# Patient Record
Sex: MALE | Race: WHITE | Employment: FULL TIME | URBAN - NONMETROPOLITAN AREA
[De-identification: names, ages, dates, MRNs, and addresses within clinical notes are randomized per-mention and may not be internally consistent; named-entity substitution may affect disease eponyms.]

---

## 2020-06-03 ENCOUNTER — HOSPITAL ENCOUNTER (OUTPATIENT)
Dept: GENERAL RADIOLOGY | Age: 50
Discharge: HOME | End: 2020-06-03
Payer: COMMERCIAL

## 2020-06-03 ENCOUNTER — HOSPITAL ENCOUNTER (OUTPATIENT)
Dept: PAIN MANAGEMENT | Age: 50
Discharge: HOME | End: 2020-06-03
Payer: COMMERCIAL

## 2020-06-03 VITALS
DIASTOLIC BLOOD PRESSURE: 83 MMHG | SYSTOLIC BLOOD PRESSURE: 145 MMHG | HEIGHT: 74 IN | TEMPERATURE: 96.8 F | HEART RATE: 86 BPM | WEIGHT: 206.13 LBS | OXYGEN SATURATION: 99 % | RESPIRATION RATE: 18 BRPM | BODY MASS INDEX: 26.45 KG/M2

## 2020-06-03 PROBLEM — M79.18 MYOFASCIAL MUSCLE PAIN: Status: ACTIVE | Noted: 2020-06-03

## 2020-06-03 PROBLEM — G89.29 CHRONIC BILATERAL LOW BACK PAIN WITHOUT SCIATICA: Status: ACTIVE | Noted: 2020-06-03

## 2020-06-03 PROBLEM — M62.830 MUSCLE SPASM OF BACK: Status: ACTIVE | Noted: 2020-06-03

## 2020-06-03 PROBLEM — M25.512 CHRONIC LEFT SHOULDER PAIN: Status: ACTIVE | Noted: 2020-06-03

## 2020-06-03 PROBLEM — M54.50 CHRONIC BILATERAL LOW BACK PAIN WITHOUT SCIATICA: Status: ACTIVE | Noted: 2020-06-03

## 2020-06-03 PROBLEM — M17.0 PRIMARY OSTEOARTHRITIS OF BOTH KNEES: Status: ACTIVE | Noted: 2020-06-03

## 2020-06-03 PROBLEM — G89.29 CHRONIC LEFT SHOULDER PAIN: Status: ACTIVE | Noted: 2020-06-03

## 2020-06-03 PROCEDURE — 99205 OFFICE O/P NEW HI 60 MIN: CPT

## 2020-06-03 PROCEDURE — 72120 X-RAY BEND ONLY L-S SPINE: CPT

## 2020-06-03 PROCEDURE — 73560 X-RAY EXAM OF KNEE 1 OR 2: CPT

## 2020-06-03 PROCEDURE — 73030 X-RAY EXAM OF SHOULDER: CPT

## 2020-06-03 PROCEDURE — 99204 OFFICE O/P NEW MOD 45 MIN: CPT | Performed by: NURSE PRACTITIONER

## 2020-06-03 RX ORDER — TESTOSTERONE CYPIONATE 200 MG/ML
1 VIAL (ML) INTRAMUSCULAR
COMMUNITY
End: 2021-04-06

## 2020-06-03 RX ORDER — OMEPRAZOLE 40 MG/1
1 CAPSULE, DELAYED RELEASE ORAL DAILY
COMMUNITY

## 2020-06-03 RX ORDER — HYDROCHLOROTHIAZIDE 25 MG/1
1 TABLET ORAL DAILY
COMMUNITY

## 2020-06-03 RX ORDER — BUPRENORPHINE AND NALOXONE 8; 2 MG/1; MG/1
2.5 FILM, SOLUBLE BUCCAL; SUBLINGUAL DAILY
COMMUNITY
End: 2021-04-06

## 2020-06-03 ASSESSMENT — PAIN DESCRIPTION - DIRECTION: RADIATING_TOWARDS: CHRONIC PAIN

## 2020-06-03 ASSESSMENT — PAIN DESCRIPTION - PAIN TYPE: TYPE: CHRONIC PAIN

## 2020-06-03 ASSESSMENT — PAIN SCALES - GENERAL: PAINLEVEL_OUTOF10: 6

## 2020-06-03 NOTE — H&P
[facility] Physical and Pain Medicine    History and Physical    Patient Name: Zackary Huerta    MR #: [id number]    Account #: [de-identified]    : 1970    Age: 52 y.o. Sex: male    Date: Loida 3, 2020    PCP: RENA Kessler CNP         Referring Provider:    Chief Complaint:   Chief Complaint   Patient presents with    Chronic Pain       History of Present Illness:    Zackary Huerta is a 52 y.o. male who presents to the office with primary complaints of low back, left shoulder, bilateral knees and on occasion his right shoulder. He has had a job that was hard on his joining most of his left. Approximately 6 months ago he had an accident where he tore his right deltoid ligament and had to have surgery. The first surgery did not correct the problem so he had a second surgery. He was started on Norco 4 times a day before the first surgery for his pain and continued on after his second surgery. He went to see his PCP and was told that if you have been on narcotics for a month or longer that you need to be started on Suboxone, which he was and has been on it going on 4 months. He went to see his PCP not to long ago and was told that he might need a different type of therapy such as going to pain management. He says that he is afraid to get injections, more so in his back for his back pain. He explains that he soul have read more on the Suboxone before he let his PCP start him on the medication. He wants to get off of the medication and even mentions that he is just going to sop taking it. He says that he cannot sleep on his left shoulder due to the pain that he has that is deep inside the joint. He also says that his knees pop and crack all of the time. Everything makes his joints hurt. He is a  at his job and he works long hours. He says that he does no care if he is every started on pain medication. He just wants off of the Suboxone and get his life back.  Says that the only thing he does is go to work and come home. He does not feel like messing with the kids and it really does upset him. He is weill to do injections to his left shoulder and both knees, but not to his back. Employment: Retired []   Disabled  []   Works [x]     Does Not Work []     Previous Injury:  Yes  []   No [x]     Previous Surgery: Yes [x]   No [] right shoulder    Previous Physical Therapy In the last 6 months? Yes  []    No [x]   Did Physical Therapy make thepain better or worse? Better []   Worse []  Unchanged []     MRI in the last two years? Yes []  No [x]  Results reviewed with patient? Yes []   No []    CT Scan in the last two years? Yes  []   No [x]  Results reviewed with patient? Yes []   No []     X-ray in the last two years? Yes []   No  [x]  Results reviewed with patient? Yes  []  No []     Injections in the past?  Yes []   No [x]   Did the injections help relieve the pain? Yes []   No []     Do you have Depression? Yes  []    No [x]  Thinking of harming yourself or others? Yes  []   No [x]    Past Medical Histoy  Past Medical History:   Diagnosis Date    Hypertension        Surgery History  History reviewed. No pertinent surgical history. Allergies  Patient has no known allergies. Current Medications  Current Outpatient Medications   Medication Sig Dispense Refill    Testosterone Cypionate 200 MG/ML SOLN Inject 1 mL into the muscle every 30 days      buprenorphine-naloxone (SUBOXONE) 8-2 MG FILM SL film Place 2.5 Film under the tongue daily.  diclofenac (VOLTAREN) 50 MG EC tablet Take 1 tablet by mouth 2 times daily      hydroCHLOROthiazide (HYDRODIURIL) 25 MG tablet Take 1 tablet by mouth daily      omeprazole (PRILOSEC) 40 MG delayed release capsule Take 1 capsule by mouth daily       No current facility-administered medications for this encounter.         Social History    Social History     Tobacco Use    Smoking status: Current Every Day Smoker   Substance

## 2020-06-11 ENCOUNTER — HOSPITAL ENCOUNTER (OUTPATIENT)
Dept: PAIN MANAGEMENT | Age: 50
Discharge: HOME | End: 2020-06-11
Payer: COMMERCIAL

## 2020-06-11 VITALS
SYSTOLIC BLOOD PRESSURE: 124 MMHG | TEMPERATURE: 97.6 F | HEART RATE: 85 BPM | DIASTOLIC BLOOD PRESSURE: 72 MMHG | OXYGEN SATURATION: 98 % | RESPIRATION RATE: 18 BRPM

## 2020-06-11 PROCEDURE — 2500000003 HC RX 250 WO HCPCS

## 2020-06-11 PROCEDURE — 6360000002 HC RX W HCPCS

## 2020-06-11 PROCEDURE — 20610 DRAIN/INJ JOINT/BURSA W/O US: CPT | Performed by: NURSE PRACTITIONER

## 2020-06-11 PROCEDURE — 20610 DRAIN/INJ JOINT/BURSA W/O US: CPT

## 2020-06-11 RX ORDER — BUPIVACAINE HYDROCHLORIDE 5 MG/ML
1 INJECTION, SOLUTION EPIDURAL; INTRACAUDAL ONCE
Status: DISCONTINUED | OUTPATIENT
Start: 2020-06-11 | End: 2020-06-13 | Stop reason: HOSPADM

## 2020-06-11 RX ORDER — TRIAMCINOLONE ACETONIDE 40 MG/ML
40 INJECTION, SUSPENSION INTRA-ARTICULAR; INTRAMUSCULAR ONCE
Status: DISCONTINUED | OUTPATIENT
Start: 2020-06-11 | End: 2020-06-13 | Stop reason: HOSPADM

## 2020-06-11 RX ORDER — LIDOCAINE HYDROCHLORIDE 10 MG/ML
1 INJECTION, SOLUTION EPIDURAL; INFILTRATION; INTRACAUDAL; PERINEURAL ONCE
Status: DISCONTINUED | OUTPATIENT
Start: 2020-06-11 | End: 2020-06-13 | Stop reason: HOSPADM

## 2020-06-11 NOTE — PROCEDURES
and signed consent obtained the patient was placed in a sitting position with the patients left arm placed to the side and elbow flexed at 90 degrees. Appropriate time out was obtained per policy. The proposed injection site was palpated at the point of maximal tenderness  [] Anterior  [x] Posterior [] Lateral and the skin over the proposed injection entry point was marked. The skin was then sprayed with Gebauer's Solution while protecting patients eyes and prepped in a sterile fashion with Prevantics swab. Under sterile technique a 22 gauge 1 1/2 inch needle was introduced and after a negative aspiration a solution of 1 ml of 0.5% Marcaine Plain, 1 ml of 1% Lidocaine Plain and     [x] 1 ml of Kenalog (40mg/ml)   [] Toradol 30 mg/ml was injected. The needle was withdrawn and a sterile dressing applied. Discharge: The patient tolerated the procedure well. There were no complications during the procedure and the patient was discharged home with discharge instructions. The patient has been instructed to contact the office should there be any complications or questions to arise between today and their next appointment.     Plan:  [x] Will return to the office in   [] 1 month  [x] 4 - 6 weeks   [] 2 months   []  3 months for:  [] Planned Procedure  [x] Procedure Follow-up   [] Office Visit      1 [facility], [facility], 9/5/2019 at 2:37 PM

## 2020-07-02 ENCOUNTER — HOSPITAL ENCOUNTER (OUTPATIENT)
Dept: PAIN MANAGEMENT | Age: 50
Discharge: HOME | End: 2020-07-02
Payer: COMMERCIAL

## 2020-07-02 VITALS
DIASTOLIC BLOOD PRESSURE: 70 MMHG | OXYGEN SATURATION: 99 % | SYSTOLIC BLOOD PRESSURE: 141 MMHG | TEMPERATURE: 96 F | RESPIRATION RATE: 18 BRPM | HEART RATE: 87 BPM

## 2020-07-02 PROCEDURE — 6360000002 HC RX W HCPCS: Performed by: NURSE PRACTITIONER

## 2020-07-02 PROCEDURE — 20610 DRAIN/INJ JOINT/BURSA W/O US: CPT | Performed by: NURSE PRACTITIONER

## 2020-07-02 PROCEDURE — 20610 DRAIN/INJ JOINT/BURSA W/O US: CPT

## 2020-07-02 RX ADMIN — TRIAMCINOLONE ACETONIDE EXTENDED-RELEASE INJECTABLE SUSPENSION 32 MG: KIT INTRA-ARTICULAR at 10:50

## 2020-07-02 RX ADMIN — TRIAMCINOLONE ACETONIDE EXTENDED-RELEASE INJECTABLE SUSPENSION 32 MG: KIT INTRA-ARTICULAR at 10:52

## 2020-07-03 NOTE — PROCEDURES
[facility] Physical and Pain Medicine      Patient Name: Shannan Pugh    : 1970    Age: 52 y.o. Sex: male    Date: 2020    Preop Diagnosis: Osteoarthritis of []  Right    [] Left   [x] Bilateral Knee(s)    Postop Diagnosis: Osteoarthritis of [] Right    [] Left   [x] Bilateral Knee(s)    Procedure: Zilretta Injection of [] Right  [] Left   [x] Bilateral Knee(s)    Previously Had Procedure:  [] Yes   [x]  No    Performing Procedure: Jaswant Garner, MSN, APRN, FNP-C    Patient Vitals for the past 24 hrs:   BP Temp Temp src Pulse Resp SpO2   20 1050 (!) 141/70 96 °F (35.6 °C) Temporal 87 18 99 %       Description of Procedure:    After a brief physical assessment and failure to improve with conservative measures the patient presented for a Zilretta injection of the  [] Right  [] Left  [x] Bilateral Knee(s). The indications, limitations and possible complications were discussed with the patient and the patient elected to proceed with the procedure. [x] Right Knee    After voluntary, informed and signed consent obtained the patient was placed in a sitting position with the knee exposed. Appropriate time out was obtained per policy. The proposed injection site was palpated at the anterior medial aspect of the knee and the skin over the proposed injection entry point was marked. The skin was sprayed with Gebauer's Solution and then prepped in a sterile fashion with Prevantics swab. Under sterile technique a 22 gauge 1 1/2 inch needle was inserted. After a negative aspiration the Zilretta 32 mg solution was injected. The needle was withdrawn and a sterile dressing applied. [x] Left Knee     After voluntary, informed and signed consent obtained the patient was placed in a sitting position with the knee exposed. Appropriate time out was obtained per policy.      The proposed injection site was palpated at the anterior medial aspect of the knee and the skin

## 2020-07-09 ENCOUNTER — TELEPHONE (OUTPATIENT)
Dept: PAIN MANAGEMENT | Age: 50
End: 2020-07-09

## 2020-07-16 ENCOUNTER — TELEPHONE (OUTPATIENT)
Dept: PAIN MANAGEMENT | Age: 50
End: 2020-07-16

## 2020-07-16 NOTE — TELEPHONE ENCOUNTER
[facility]. He has been assigned to go out of town with his job on 7/25/20 and will be gone for approximately 14-18 days. He has discussed back injection with Solange at previous office visit, but was not wanting to do these. He is having more issue with his back at this time, and would like to be seen prior to leaving to either discuss scheduling injections or have injections done. I returned his call and let him know that Solange's schedule is full at this time, and I would have to talk with her if there are any openings, as we are scheduling into October currently. I then spoke with Solange, but she does not have any availability right now. I let the patient know and have transferred him to the third floor scheduling line to cancel and reschedule his existing appointment. He is aware that the appointments are not currently available and that this will extend his office visit into October.

## 2020-10-21 ENCOUNTER — TELEPHONE (OUTPATIENT)
Dept: PAIN MANAGEMENT | Age: 50
End: 2020-10-21

## 2020-10-21 NOTE — TELEPHONE ENCOUNTER
Call received from patient requesting to be scheduled for injections. Patient was last seen on 7/2 for a procedure appointment. He went out of town and had to cancel his previous follow up office visit. He was rescheduled for 10/12/20, and failed to attend that appointment. He is now asking to be scheduled for injections. Per Solange the patient will need to reschedule and attend a follow up office visit before any further injections can be scheduled. I have attempted to call the patient back at this time, 10/21 [id number]. He did not answer. I left voicemail explaining that he will need to reschedule an office visit.

## 2020-10-29 ENCOUNTER — TELEPHONE (OUTPATIENT)
Dept: PAIN MANAGEMENT | Age: 50
End: 2020-10-29

## 2020-10-29 NOTE — TELEPHONE ENCOUNTER
Patient left message on third floor scheduling line on 10/28 at [phone]. Call was transferred today 10/29 to nurse line. Patient is asking to be scheduled for injections, as he has not been seen in awhile and his pain is returning. Call to patient today at [phone]. Patient did not answer. I left a voicemail. This is a second attempt to contact the patient regarding this. First voicemail was left last week on 10/21/20. I have again let the patient know that he cannot be scheduled for injections unless he is first seen in the office, and to call the third floor scheduling line to schedule an appointment if he does not already have one.

## 2021-04-06 ENCOUNTER — HOSPITAL ENCOUNTER (OUTPATIENT)
Dept: PAIN MANAGEMENT | Age: 51
Discharge: HOME | End: 2021-04-06
Payer: COMMERCIAL

## 2021-04-06 VITALS
OXYGEN SATURATION: 96 % | HEIGHT: 74 IN | BODY MASS INDEX: 28.62 KG/M2 | HEART RATE: 73 BPM | SYSTOLIC BLOOD PRESSURE: 132 MMHG | TEMPERATURE: 97 F | DIASTOLIC BLOOD PRESSURE: 78 MMHG | WEIGHT: 223 LBS

## 2021-04-06 PROCEDURE — 99213 OFFICE O/P EST LOW 20 MIN: CPT | Performed by: NURSE PRACTITIONER

## 2021-04-06 PROCEDURE — 99213 OFFICE O/P EST LOW 20 MIN: CPT

## 2021-04-06 RX ORDER — OMEPRAZOLE 40 MG/1
40 CAPSULE, DELAYED RELEASE ORAL DAILY
COMMUNITY

## 2021-04-06 RX ORDER — AMLODIPINE BESYLATE 10 MG/1
TABLET ORAL
COMMUNITY

## 2021-04-06 RX ORDER — MELOXICAM 15 MG/1
TABLET ORAL
COMMUNITY

## 2021-04-06 RX ORDER — LISINOPRIL 10 MG/1
TABLET ORAL
COMMUNITY
Start: 2021-03-16

## 2021-04-06 RX ORDER — SERTRALINE HYDROCHLORIDE 25 MG/1
TABLET, FILM COATED ORAL
COMMUNITY
Start: 2021-01-17

## 2021-04-06 ASSESSMENT — PAIN SCALES - GENERAL: PAINLEVEL_OUTOF10: 6

## 2021-04-06 NOTE — PROGRESS NOTES
Clinic Documentation      Education Provided:  [x] Review of Chelsea Pedersen  [] Agreement Review  [x] PEG Score Calculated [] PHQ Score Calculated [] ORT Score Calculated    [] Compliance Issues Discussed [] Cognitive Behavior Needs [x] Exercise [] Review of Test [] Financial Issues  [x] Tobacco/Alcohol Use Reviewed [x] Teaching [] New Patient [] Picture Obtained    Physician Plan:  [] Outgoing Referral  [] Pharmacy Consult  [] Test Ordered [x] Prescription Ordered/Changed   [] Obtained Test Results / Consult Notes        Complete if patient is withholding blood thinner for procedure     Blood Thinner Patient is currently taking:      [] Plavix (Hold for 7 days)  [] Aspirin (Hold for 5 days)     [] Pletal (Hold for 2 days)  [] Pradaxa (Hold for 3 days)    [] Effient (Hold for 7 days)  [] Xarelto (Hold for 2 days)    [] Eliquis (Hold for 2 days)  [] Brilinta (Hold for 7 days)    [] Coumadin (Hold for 5 days) - (INR needs to be drawn the day prior to procedure- INR < 2.0)    [] Aggrenox (Hold for 7 days)        [] Patient will stop medication on their own.    [] Blood Thinner Form Faxed for approval to hold.    Provider form faxed to:   Assessment Completed by:  Electronically signed by Cristofer Weinstein on 4/6/2021 at 3:55 PM

## 2021-04-06 NOTE — PROGRESS NOTES
[facility] Physical and Pain Medicine    Office Progress Note    Patient Name: Kymberly Hilliard    MR #: [id number]    Account #: [de-identified]    : 1970    Age: 54 y.o. Sex: male    Date: 2021    PCP: RENA Peres CNP         Referring Provider:    Chief Complaint:   Chief Complaint   Patient presents with    Back Pain       History of Present Illness:    Kymberly Hilliard is a 54 y.o. male who presents to the office for follow-up of bilateral knee injections with Zaid Wick and left shoulder injection. He says that he obtained 80% relief for 3 months. He has not been seen in the office since he got the injections in 2020. He is wanting them repeated. Last pain management HPI note read    Employment: Retired []   Disabled  []   Works [x]    Does Not Work []     Previous Injury:  Yes  []   No [x]     Previous Surgery: Yes [x]   No []     Previous Physical Therapy In the last 6 months? Yes  []     No [x]   Did Physical Therapy make thepain better or worse? Better []   Worse []  Unchanged []    MRI in the last two years? Yes []  No [x]   Results reviewed with patient? Yes []   No []    CT Scan in the last two years? Yes  []   No [x]   Results reviewed with patient? Yes []   No []    X-ray in the last two years? Yes [x]   No  []  Results reviewed with patient? Yes  [x]  No []    Injections in the past?  Yes [x]   No []   Did the injections help relieve the pain? Yes [x]   No []     Do you have Depression? Yes  []    No [x]   Thinking of harming yourself or others? Yes  []   No [x]     Past Medical Histoy  Past Medical History:   Diagnosis Date    Hypertension        Surgery History  History reviewed. No pertinent surgical history. Allergies  Patient has no known allergies.      Current Medications  Current Outpatient Medications   Medication Sig Dispense Refill    amLODIPine (NORVASC) 10 MG tablet amlodipine 10 mg tablet   TAKE 1 TABLET BY MOUTH EVERY DAY  DOSES DO NOT START BEFORE 0928      sertraline (ZOLOFT) 25 MG tablet TAKE 1 TABLET BY MOUTH EVERY DAY      meloxicam (MOBIC) 15 MG tablet meloxicam 15 mg tablet   TAKE 1 TABLET BY MOUTH EVERY DAY      lisinopril (PRINIVIL;ZESTRIL) 10 MG tablet TAKE 1 TABLET BY MOUTH TWICE A DAY      omeprazole (PRILOSEC) 40 MG delayed release capsule Take 40 mg by mouth daily      hydroCHLOROthiazide (HYDRODIURIL) 25 MG tablet Take 1 tablet by mouth daily      omeprazole (PRILOSEC) 40 MG delayed release capsule Take 1 capsule by mouth daily       No current facility-administered medications for this encounter. Social History    Social History     Tobacco Use    Smoking status: Current Every Day Smoker    Smokeless tobacco: Never Used   Substance Use Topics    Alcohol use: Not Currently         Family History  family history is not on file. Review of Systems:  Constitutional: denies fever, chills, fatigue, change in appetite, weight gain or weight loss  Head: Normocephalic  Skin: denies easy bruising, skin redness, skin rash, hives, sensitivity to sun exposure, tightness, nodules or bumps, hair loss, color changes in the hands or feet with cold. Eyes: denies pain, redness, loss of vision, double or blurred vision, eye drainage, or dryness. ENT and Mouth: denies ringing in the ears, loss of hearing, nasal congestion, nasal discharge, no hoarseness, sore throat, or difficulty swallowing   Respiratory: denies chronic dry cough, coughing up blood, coughing up mucus, waking at night coughing or choking, or wheezing. Cardiovascular: denies chest pain, irregular heartbeats, palpitations, shortness of breath, or edema in legs  Gastrointestinal: denies, nausea, vomiting, heartburn, diarrhea, or constipation  Genitourinary: denies difficult urination, pain or burning with urination, blood in the urine, or cloudy urine  Musculoskeletal: denies arm, buttock, thigh or calf cramps.  Has pain in bilateral knees and left shoulder, left shoulder and bilateral knee tenderness. No muscle weakness. No joint swelling. Neurologic: headache, dizziness, fainting, loss of consciousness, no memory loss. no sensitivity. Endocrine: denies intolerance to hot or cold temperature, night sweats, flushing, fingernail changes, increased thirst, or hairloss   Hematologic/ Lymphatic: denies anemia, bleeding tendency or clotting tendency, bruising easily. Allergic/ Immunologic: denies rhinitis, asthma, skin sensitivity, or allergy to Latex   Psychiatric: denies depression or thoughts of suicide, or voices in head. Current Pain Assessment:   Pain Assessment  Pain Assessment: 0-10  Pain Level: 6  Patient's Stated Pain Goal: 3  Pain Type: Chronic pain    Clinical Progression: gradually improving  Effect of Pain on Daily Activities: limits activity  Patient's Stated Pain Goal: No pain  Pain Intervention(s): Medication (see eMar), Repositioning, Rest, Ice    Current PE    ORT Score: 1    PHQ-9 Score:     Physical Exam:    Vitals:    21 1536   BP: 132/78   Pulse: 73   Temp: 97 °F (36.1 °C)   SpO2: 96%   Weight: 223 lb (101.2 kg)   Height: 6' 2\" (1.88 m)       Body mass index is 28.63 kg/m². General Appearance: no acute distress. Appears to be well dressed  Skin Exam: Warm and dry, no jaundice, rashes or lesions  Head Exam: NCAT, PERRLA, EOMI, moist mucus membranes, scalp normal  Neck Exam: Supple, no masses palpated, trachea midline. Lung: Clear to ausculation in all lobes anterior and posterior. Heart: Regular rate and rhythm, no gallops, rubs or murmurs, no edema  Abdomen:  Bowel sounds in all quadrants, soft, non-distended, non-tender with palpation, no guarding  Extremities: No rash, cyanosis or bruising  Musculoskeletal: No joint swelling or deformity   Back Exam: full ROM  Hip Exam: Full rotation bilateral   Knee Exam: crepitus in bilateral knees with flexion and extension  Shoulder Exam: pain with Increase in activities with less pain  [x]        Decrease in medication      [] Benzodiazapine's and Narcotics:  Patient educated on the possible effects of combining Benzodiazapine's and Opioids. Explained \"Black Box Warnings\" such as; possible suppressed breathing, hypoxia, anoxia, depressed cognition, heart arrhythmia, coma and possible death. Patient verbalized understanding concerning possible effects. Controlled Substance Monitoring:  Attestation: The ALETHEA report for this patient was reviewed today. Discussed with patient possible medication side effects, risk of tolerance, dependence and alternative treatments. Discussed the growing epidemic in the [place] with the overprescribing and at times the abuse of narcotics. Discussed the detrimental effects of long term narcotic use. Patient encouraged to set daily goals of exercising and decreasing daily narcotic intake. Discussed with the patient about the development of hyperalgesia with long term narcotic intake. EMR dragon/transcription disclaimer: Much of this encounter note is electronic transcription/translation of spoken language to printed tach. Electronic translation of spoken language may be erroneous, or at times, nonsensical words or phrases may be inadvertently transcribed.  Although, I have reviewed the note for such errors, some may still exist.     CC:  Uriah Larkin, [address] [facility], APRN, 4/6/2021 at 3:45 PM

## 2021-05-13 ENCOUNTER — TELEPHONE (OUTPATIENT)
Dept: PAIN MANAGEMENT | Age: 51
End: 2021-05-13

## 2021-05-13 NOTE — TELEPHONE ENCOUNTER
Patient left message on nurse line that his injections were denied by insurance. He stated that he is asking the provider if anything else can be done. He said that he is missing work 1-2 days due to pain in the shoulder and back. I have messaged Solange to make her aware, and also messaged .

## 2021-05-19 ENCOUNTER — TRANSCRIBE ORDERS (OUTPATIENT)
Dept: ADMINISTRATIVE | Facility: HOSPITAL | Age: 51
End: 2021-05-19

## 2021-05-19 DIAGNOSIS — M25.512 LEFT SHOULDER PAIN, UNSPECIFIED CHRONICITY: Primary | ICD-10-CM

## 2021-05-20 ENCOUNTER — APPOINTMENT (OUTPATIENT)
Dept: MRI IMAGING | Facility: HOSPITAL | Age: 51
End: 2021-05-20

## 2021-05-25 ENCOUNTER — APPOINTMENT (OUTPATIENT)
Dept: MRI IMAGING | Facility: HOSPITAL | Age: 51
End: 2021-05-25

## 2021-05-27 ENCOUNTER — HOSPITAL ENCOUNTER (OUTPATIENT)
Dept: PAIN MANAGEMENT | Age: 51
Discharge: HOME | End: 2021-05-27
Payer: COMMERCIAL

## 2021-05-27 VITALS
SYSTOLIC BLOOD PRESSURE: 131 MMHG | RESPIRATION RATE: 20 BRPM | DIASTOLIC BLOOD PRESSURE: 72 MMHG | OXYGEN SATURATION: 99 % | TEMPERATURE: 96.8 F | HEART RATE: 102 BPM

## 2021-05-27 PROCEDURE — 20610 DRAIN/INJ JOINT/BURSA W/O US: CPT | Performed by: NURSE PRACTITIONER

## 2021-05-27 PROCEDURE — 2500000003 HC RX 250 WO HCPCS

## 2021-05-27 PROCEDURE — 20610 DRAIN/INJ JOINT/BURSA W/O US: CPT

## 2021-05-27 PROCEDURE — 6360000002 HC RX W HCPCS

## 2021-05-27 RX ORDER — LIDOCAINE HYDROCHLORIDE 10 MG/ML
1 INJECTION, SOLUTION EPIDURAL; INFILTRATION; INTRACAUDAL; PERINEURAL ONCE
Status: DISCONTINUED | OUTPATIENT
Start: 2021-05-27 | End: 2021-05-29 | Stop reason: HOSPADM

## 2021-05-27 RX ORDER — BUPIVACAINE HYDROCHLORIDE 5 MG/ML
1 INJECTION, SOLUTION EPIDURAL; INTRACAUDAL ONCE
Status: DISCONTINUED | OUTPATIENT
Start: 2021-05-27 | End: 2021-05-29 | Stop reason: HOSPADM

## 2021-05-27 RX ORDER — TRIAMCINOLONE ACETONIDE 40 MG/ML
40 INJECTION, SUSPENSION INTRA-ARTICULAR; INTRAMUSCULAR ONCE
Status: DISCONTINUED | OUTPATIENT
Start: 2021-05-27 | End: 2021-05-29 | Stop reason: HOSPADM

## 2021-05-27 NOTE — PROGRESS NOTES
Procedure:  Level of Consciousness: [x]Alert [x]Oriented []Disoriented []Lethargic  Anxiety Level: [x]Calm []Anxious []Depressed []Other  Skin: [x]Warm [x]Dry []Cool []Moist []Intact []Other  Cardiovascular: [x]Palpitations: []Never []Occasionally []Frequently  Chest Pain: [x]No []Yes  Respiratory:  [x]Unlabored []Labored []Cough ([] Productive []Unproductive)  HCG Required: [x]No []Yes   Results: []Negative []Positive  Knowledge Level:        []Patient/Other verbalized understanding of pre-procedure instructions. [x]Assessment of post-op care needs (transportation, responsible caregiver)        [x]Able to discuss health care problems and how to deal with it.   Factors that Affect Teaching:        Language Barrier: [x]No []Yes - why:        Hearing Loss:        [x]No []Yes            Corrective Device:  []Yes []No        Vision Loss:           [x]No []Yes            Corrective Device:  []Yes []No        Memory Loss:       [x]No []Yes            []Short Term []Long Term  Motivational Level:  [x]Asks Questions                  []Extremely Anxious       [x]Seems Interested               []Seems Uninterested                  [x]Denies need for Education  Risk for Injury:  [x]Patient oriented to person, place and time  []History of frequent falls/loss of balance  Nutritional:  []Change in appetite   []Weight Gain   []Weight Loss  Functional:  []Requires assistance with ADL's

## 2021-05-28 NOTE — PROCEDURES
[facility] Physical and Pain Medicine        Patient Name: Avery Lozano    : 1970    Age: 54 y.o. Sex: male    Date: May 28, 2021    Preop Diagnosis: Osteoarthritis of  [] Right  [x]  Left  []  Bilateral Shoulder(s)    Postop Diagnosis: Osteoarthritis of [] Right  [x]  Left  []  Bilateral Shoulder(s)    Procedure: Steroid Injection of  [] Right  [x]  Left   []  Bilateral Shoulder(s)    Performing Procedure:  Erendira Poon, MSN, APRN, FNP-C    Previously Had Procedure:   [x] Yes   []  No    Patient Vitals for the past 24 hrs:   BP Temp Temp src Pulse Resp SpO2   21 1029 131/72 96.8 °F (36 °C) Temporal 102 20 99 %       Description of Procedure:    After a brief physical assessment and failure to improve with conservative measures the patient presented for an injection of the [] Right  [x] Left   [] Bilateral Shoulder(s). The indications, limitations and possible complications were discussed with the patient and the patient elected to proceed with the procedure. [] Right Shoulder    After voluntary, informed and signed consent obtained the patient was placed in a sitting position with the patients right arm placed to the side and elbow flexed at 90 degrees. Appropriate time out was obtained per policy. The proposed injection site was palpated at the point of maximal tenderness [] Anterior  [x] Posterior [] Lateral and the skin over the proposed injection entry point was marked. The skin was then sprayed with Gebauer's Solution while protecting patients eyes and prepped in a sterile fashion with Prevantics swab. Under sterile technique a 22 gauge 1 1/2 inch needle was introduced and after a negative aspiration a solution of 1 ml of 0.5% Marcaine Plain, 1 ml of 1% Lidocaine Plain and       [] 1 ml of Kenalog (40mg/ml)   [] Toradol 30 mg/ml was injected. The needle was withdrawn and a sterile dressing applied.     [x] Left Shoulder     After voluntary, informed and signed consent obtained the patient was placed in a sitting position with the patients left arm placed to the side and elbow flexed at 90 degrees. Appropriate time out was obtained per policy. The proposed injection site was palpated at the point of maximal tenderness  [] Anterior  [x] Posterior [] Lateral and the skin over the proposed injection entry point was marked. The skin was then sprayed with Gebauer's Solution while protecting patients eyes and prepped in a sterile fashion with Prevantics swab. Under sterile technique a 22 gauge 1 1/2 inch needle was introduced and after a negative aspiration a solution of 1 ml of 0.5% Marcaine Plain, 1 ml of 1% Lidocaine Plain and     [x] 1 ml of Kenalog (40mg/ml)   [] Toradol 30 mg/ml was injected. The needle was withdrawn and a sterile dressing applied. Discharge: The patient tolerated the procedure well. There were no complications during the procedure and the patient was discharged home with discharge instructions. The patient has been instructed to contact the office should there be any complications or questions to arise between today and their next appointment.     Plan:  [x] Will return to the office in   [] 1 month  [x] 4 - 6 weeks   [] 2 months   []  3 months for:  [] Planned Procedure  [x] Procedure Follow-up   [] Office Visit      1 [facility], [facility], 9/5/2019 at 2:37 PM

## 2021-06-02 ENCOUNTER — TELEPHONE (OUTPATIENT)
Dept: PAIN MANAGEMENT | Age: 51
End: 2021-06-02

## 2021-06-03 ENCOUNTER — TRANSCRIBE ORDERS (OUTPATIENT)
Dept: ADMINISTRATIVE | Facility: HOSPITAL | Age: 51
End: 2021-06-03

## 2021-06-03 DIAGNOSIS — M79.602 LEFT ARM PAIN: Primary | ICD-10-CM

## 2021-07-12 ENCOUNTER — APPOINTMENT (OUTPATIENT)
Dept: NEUROLOGY | Facility: HOSPITAL | Age: 51
End: 2021-07-12

## 2021-07-27 ENCOUNTER — HOSPITAL ENCOUNTER (OUTPATIENT)
Dept: PAIN MANAGEMENT | Age: 51
Discharge: HOME | End: 2021-07-27
Payer: COMMERCIAL

## 2021-07-27 VITALS
DIASTOLIC BLOOD PRESSURE: 79 MMHG | BODY MASS INDEX: 26.44 KG/M2 | HEIGHT: 74 IN | SYSTOLIC BLOOD PRESSURE: 127 MMHG | WEIGHT: 206 LBS | TEMPERATURE: 97 F | HEART RATE: 80 BPM | OXYGEN SATURATION: 99 %

## 2021-07-27 DIAGNOSIS — G89.29 CHRONIC LOW BACK PAIN WITH BILATERAL SCIATICA, UNSPECIFIED BACK PAIN LATERALITY: Primary | ICD-10-CM

## 2021-07-27 DIAGNOSIS — M54.42 CHRONIC LOW BACK PAIN WITH BILATERAL SCIATICA, UNSPECIFIED BACK PAIN LATERALITY: Primary | ICD-10-CM

## 2021-07-27 DIAGNOSIS — M54.41 CHRONIC LOW BACK PAIN WITH BILATERAL SCIATICA, UNSPECIFIED BACK PAIN LATERALITY: Primary | ICD-10-CM

## 2021-07-27 PROCEDURE — 99213 OFFICE O/P EST LOW 20 MIN: CPT

## 2021-07-27 PROCEDURE — 99213 OFFICE O/P EST LOW 20 MIN: CPT | Performed by: NURSE PRACTITIONER

## 2021-07-27 RX ORDER — HYDROCODONE BITARTRATE AND ACETAMINOPHEN 7.5; 325 MG/1; MG/1
1 TABLET ORAL EVERY 8 HOURS PRN
Qty: 90 TABLET | Refills: 0 | Status: SHIPPED | OUTPATIENT
Start: 2021-07-27 | End: 2021-08-26 | Stop reason: SDUPTHER

## 2021-07-27 ASSESSMENT — PAIN DESCRIPTION - LOCATION: LOCATION: BACK

## 2021-07-27 ASSESSMENT — PAIN DESCRIPTION - PAIN TYPE: TYPE: CHRONIC PAIN

## 2021-07-27 ASSESSMENT — PAIN SCALES - GENERAL: PAINLEVEL_OUTOF10: 7

## 2021-07-27 NOTE — PROGRESS NOTES
[facility] Physical and Pain Medicine    Office Progress Note    Patient Name: Mahendra Fernández    MR #: [id number]    Account #: [de-identified]    : 1970    Age: 54 y.o. Sex: male    Date: 2021    PCP: RENA Frank CNP         Referring Provider:    Chief Complaint:   Chief Complaint   Patient presents with    Knee Pain     bilateral       History of Present Illness:    Mahendra Fernández is a 54 y.o. male who presents to the office for follow-up of left shoulder injection. He says that he obtained 80% relief for 6 weeks. He has found that he has a torn biceps muscle in the left arm along with the torn rotator cuff. He also complains of pain in his low back and had recent x-rays completed. He does not have the results with him today. He was offered PT, MRI of low back and injections, but he does not want to have any injections in his back for fear of the same thing happening to him that happened to a friend of his. He also is complaining of pain in his bilateral knees. He has had Zilretta injections to his knees in the past with 80% relief for 6 weeks. He says that the injections helped with his pain. It allowed him to continue working. He currently is the only one providing income at this time. He also is asking for something to help with the pain so he can continue working. Discussed UDS with him. He did get narcotics from another provider, but a contract was not signed at this office at that time. Last pain management HPI note read    Employment: Retired []   Disabled  []   Works [x]    Does Not Work []     Previous Injury:  Yes  []   No [x]     Previous Surgery: Yes [x]   No []     Previous Physical Therapy In the last 6 months? Yes  []     No [x]   Did Physical Therapy make thepain better or worse? Better []   Worse []  Unchanged []    MRI in the last two years? Yes []  No [x]   Results reviewed with patient?   Yes []   No []    CT Scan in the last two Normocephalic  Skin: denies easy bruising, skin redness, skin rash, hives, sensitivity to sun exposure, tightness, nodules or bumps, hair loss, color changes in the hands or feet with cold. Eyes: denies pain, redness, loss of vision, double or blurred vision, eye drainage, or dryness. ENT and Mouth: denies ringing in the ears, loss of hearing, nasal congestion, nasal discharge, no hoarseness, sore throat, or difficulty swallowing   Respiratory: denies chronic dry cough, coughing up blood, coughing up mucus, waking at night coughing or choking, or wheezing. Cardiovascular: denies chest pain, irregular heartbeats, palpitations, shortness of breath, or edema in legs  Gastrointestinal: denies, nausea, vomiting, heartburn, diarrhea, or constipation  Genitourinary: denies difficult urination, pain or burning with urination, blood in the urine, or cloudy urine  Musculoskeletal: denies arm, buttock, thigh or calf cramps. Has pain in bilateral knees and left shoulder, and bilateral knee tenderness. Tenderness in low back. No muscle weakness. No joint swelling. Neurologic: headache, dizziness, fainting, loss of consciousness, no memory loss. no sensitivity. Endocrine: denies intolerance to hot or cold temperature, night sweats, flushing, fingernail changes, increased thirst, or hairloss   Hematologic/ Lymphatic: denies anemia, bleeding tendency or clotting tendency, bruising easily. Allergic/ Immunologic: denies rhinitis, asthma, skin sensitivity, or allergy to Latex   Psychiatric: denies depression or thoughts of suicide, or voices in head.        Current Pain Assessment:   Pain Assessment  Pain Assessment: 0-10  Pain Level: 7  Patient's Stated Pain Goal: 3  Pain Type: Chronic pain  Pain Location: Back    Clinical Progression: gradually improving  Effect of Pain on Daily Activities: limits activity  Patient's Stated Pain Goal: No pain  Pain Intervention(s): Medication (see eMar), Repositioning, Rest, Ice    Current PE    ORT Score: 1    PHQ-9 Score: 0    Physical Exam:    Vitals:    21 1527   BP: 127/79   Pulse: 80   Temp: 97 °F (36.1 °C)   TempSrc: Temporal   SpO2: 99%   Weight: 206 lb (93.4 kg)   Height: 6' 2\" (1.88 m)       Body mass index is 26.45 kg/m². General Appearance: no acute distress. Appears to be well dressed  Skin Exam: Warm and dry, no jaundice, rashes or lesions  Head Exam: NCAT, PERRLA, EOMI, moist mucus membranes, scalp normal  Neck Exam: Supple, no masses palpated, trachea midline. Lung: Clear to ausculation in all lobes anterior and posterior. Heart: Regular rate and rhythm, no gallops, rubs or murmurs, no edema  Abdomen: Bowel sounds in all quadrants, soft, non-distended, non-tender with palpation, no guarding  Extremities: No rash, cyanosis or bruising  Musculoskeletal: No joint swelling or deformity   Back Exam: Tender ness with palpation of low back  Hip Exam: Full rotation bilateral   Knee Exam: severe crepitus in bilateral knees with flexion and extension  Shoulder Exam: slight pain with rotation of left shoulder  Neurologic Exam: Gait and coordination normal, speech normal  Reflexes: Normal brachialis, Negative Redd's bilateral. Normal Patellar bilateral,   CN EXAM: II-XII intact, face symmetrical, tongue symmetrical, the trapezius and sternocleidomastoid muscle appearance and strength symmetrical, normal achilles bilateral, ankle clonus negative bilateral  Strength: 5/5 RUE Bi's/Tri's, 5/5 LUE Bi's/Tri's, 5/5 RLE knee flex/ext, 5/5 RLE DF/PF, 5/5 LLE knee flex/ext, 5/5 LLE DF/PF  Sensation: Equal and intact to fine touch in all extremities  Mood and affect: Normal   Nurses note reviewed along with current vital signs    Active Problem(s)  Active Problems:    Primary osteoarthritis of both knees    Chronic left shoulder pain    Chronic bilateral low back pain without sciatica  Resolved Problems:    * No resolved hospital problems.  * PLAN:  1. Patient is to call the office with any questions or concerns that may arise prior to next appointment. 2. Schedule patient for bilateral knee injections with Zilretta  3. Norco 7.5 every 8 hours prn pain #90 (sent)  4. Patient to bring x-ray reports of back to next appointment    Urine Drug Screen Current/Today:  Yes  []  No [x]     Discussion:  Discussed exam findings and plan of care with patient. Patient agreed with the current plan of care at this time. All questions from the patient were answered by the provider. Activity:   Discussed exercise as beneficial to pain reduction, encouraged stretching exercise with a focus on torso strengthening. Education Provided:  Review of Jinx File [x] Agreement Review [x]  Reviewed PHQ-9  [x]    Review of Test [] Compliance Issues Discussed []   Cognitive Behavior Needs [] Exercise [x]  Financial Issues []   Tobacco/Alcohol Use [] Teaching  [x]     Goal:  Pain Management Goals of Therapy:   []        Resolution in pain  [x]        Decrease in pain level  [x]        Improvement in ADL's  [x]        Increase in activities with less pain  [x]        Decrease in medication      [] Benzodiazapine's and Narcotics:  Patient educated on the possible effects of combining Benzodiazapine's and Opioids. Explained \"Black Box Warnings\" such as; possible suppressed breathing, hypoxia, anoxia, depressed cognition, heart arrhythmia, coma and possible death. Patient verbalized understanding concerning possible effects. Controlled Substance Monitoring:  Attestation: The ALETHEA report for this patient was reviewed today. Discussed with patient possible medication side effects, risk of tolerance, dependence and alternative treatments. Discussed the growing epidemic in the [place] with the overprescribing and at times the abuse of narcotics. Discussed the detrimental effects of long term narcotic use. Patient encouraged to set daily goals of exercising and decreasing daily narcotic intake. Discussed with the patient about the development of hyperalgesia with long term narcotic intake. EMR dragon/transcription disclaimer: Much of this encounter note is electronic transcription/translation of spoken language to printed tach. Electronic translation of spoken language may be erroneous, or at times, nonsensical words or phrases may be inadvertently transcribed.  Although, I have reviewed the note for such errors, some may still exist.     CC:  Chinedu Yang [facility], APRN, 7/27/2021 at 7:19 PM

## 2021-08-26 ENCOUNTER — HOSPITAL ENCOUNTER (OUTPATIENT)
Dept: PAIN MANAGEMENT | Age: 51
Discharge: HOME | End: 2021-08-26
Payer: COMMERCIAL

## 2021-08-26 VITALS
DIASTOLIC BLOOD PRESSURE: 75 MMHG | RESPIRATION RATE: 20 BRPM | HEART RATE: 97 BPM | SYSTOLIC BLOOD PRESSURE: 127 MMHG | TEMPERATURE: 96.5 F | OXYGEN SATURATION: 96 %

## 2021-08-26 DIAGNOSIS — G89.29 CHRONIC LOW BACK PAIN WITH BILATERAL SCIATICA, UNSPECIFIED BACK PAIN LATERALITY: ICD-10-CM

## 2021-08-26 DIAGNOSIS — M54.42 CHRONIC LOW BACK PAIN WITH BILATERAL SCIATICA, UNSPECIFIED BACK PAIN LATERALITY: ICD-10-CM

## 2021-08-26 DIAGNOSIS — M54.41 CHRONIC LOW BACK PAIN WITH BILATERAL SCIATICA, UNSPECIFIED BACK PAIN LATERALITY: ICD-10-CM

## 2021-08-26 PROCEDURE — 6360000002 HC RX W HCPCS

## 2021-08-26 PROCEDURE — 20610 DRAIN/INJ JOINT/BURSA W/O US: CPT

## 2021-08-26 PROCEDURE — 20610 DRAIN/INJ JOINT/BURSA W/O US: CPT | Performed by: NURSE PRACTITIONER

## 2021-08-26 RX ORDER — HYDROCODONE BITARTRATE AND ACETAMINOPHEN 7.5; 325 MG/1; MG/1
1 TABLET ORAL EVERY 8 HOURS PRN
Qty: 90 TABLET | Refills: 0 | Status: SHIPPED | OUTPATIENT
Start: 2021-08-26 | End: 2021-09-27 | Stop reason: SDUPTHER

## 2021-08-26 NOTE — PROGRESS NOTES

## 2021-08-26 NOTE — PROCEDURES
[facility] Physical and Pain Medicine      Patient Name: Artemio Sterling    : 1970    Age: 54 y.o. Sex: male    Date: 2021    Preop Diagnosis: Osteoarthritis of []  Right    [] Left   [x] Bilateral Knee(s)    Postop Diagnosis: Osteoarthritis of [] Right    [] Left   [x] Bilateral Knee(s)    Procedure: Zilretta Injection of [] Right  [] Left   [x] Bilateral Knee(s)    Previously Had Procedure:  [x] Yes   []  No    Performing Procedure: Heydi Islas, MSN, APRN, FNP-C    Patient Vitals for the past 24 hrs:   BP Temp Temp src Pulse Resp SpO2   21 1113 127/75 96.5 °F (35.8 °C) Temporal 97 20 96 %       Description of Procedure:    After a brief physical assessment and failure to improve with conservative measures the patient presented for a Zilretta injection of the  [] Right  [] Left  [x] Bilateral Knee(s). The indications, limitations and possible complications were discussed with the patient and the patient elected to proceed with the procedure. [x] Right Knee    After voluntary, informed and signed consent obtained the patient was placed in a sitting position with the knee exposed. Appropriate time out was obtained per policy. The proposed injection site was palpated at the anterior medial aspect of the knee and the skin over the proposed injection entry point was marked. The skin was sprayed with Gebauer's Solution and then prepped in a sterile fashion with Prevantics swab. Under sterile technique a 22 gauge 1 1/2 inch needle was inserted. After a negative aspiration the Zilretta 32 mg solution was injected. The needle was withdrawn and a sterile dressing applied. [x] Left Knee     After voluntary, informed and signed consent obtained the patient was placed in a sitting position with the knee exposed. Appropriate time out was obtained per policy.      The proposed injection site was palpated at the anterior medial aspect of the knee and the skin over the proposed injection entry point was marked. The skin was then sprayed with Gebauer's Solution and prepped in a sterile fashion with Prevantics swab. Under sterile technique a 22 gauge 1 1/2 inch needle was inserted. After a negative aspiration the Zilretta 32 mg solution was injected. The needle was withdrawn and a sterile dressing applied. Discharge: The patient tolerated the procedure well. There were no complications during the procedure and the patient was discharged home with discharge instructions. The patient has been instructed to contact the office should there be any complications or questions to arise between today and their next appointment.     Plan:  [x] Will return to the office in  [] 1 month  [x] 4 - 6 weeks   [] 2 months  [] 3 months for:  [] Planned Procedure   [x] Procedure Follow-up   [] Office Visit      1 [facility], [facility], 8/26/2021 at 4:51 PM

## 2021-09-27 DIAGNOSIS — G89.29 CHRONIC LOW BACK PAIN WITH BILATERAL SCIATICA, UNSPECIFIED BACK PAIN LATERALITY: ICD-10-CM

## 2021-09-27 DIAGNOSIS — M54.42 CHRONIC LOW BACK PAIN WITH BILATERAL SCIATICA, UNSPECIFIED BACK PAIN LATERALITY: ICD-10-CM

## 2021-09-27 DIAGNOSIS — M54.41 CHRONIC LOW BACK PAIN WITH BILATERAL SCIATICA, UNSPECIFIED BACK PAIN LATERALITY: ICD-10-CM

## 2021-09-27 RX ORDER — HYDROCODONE BITARTRATE AND ACETAMINOPHEN 7.5; 325 MG/1; MG/1
1 TABLET ORAL EVERY 8 HOURS PRN
Qty: 90 TABLET | Refills: 0 | Status: SHIPPED | OUTPATIENT
Start: 2021-09-27 | End: 2021-10-27 | Stop reason: SDUPTHER

## 2021-10-27 ENCOUNTER — HOSPITAL ENCOUNTER (OUTPATIENT)
Dept: PAIN MANAGEMENT | Age: 51
Discharge: HOME | End: 2021-10-27
Payer: COMMERCIAL

## 2021-10-27 VITALS
HEART RATE: 90 BPM | DIASTOLIC BLOOD PRESSURE: 86 MMHG | WEIGHT: 214 LBS | BODY MASS INDEX: 27.46 KG/M2 | SYSTOLIC BLOOD PRESSURE: 160 MMHG | HEIGHT: 74 IN | OXYGEN SATURATION: 98 % | TEMPERATURE: 97 F

## 2021-10-27 DIAGNOSIS — G89.29 CHRONIC LOW BACK PAIN WITH BILATERAL SCIATICA, UNSPECIFIED BACK PAIN LATERALITY: ICD-10-CM

## 2021-10-27 DIAGNOSIS — M54.41 CHRONIC LOW BACK PAIN WITH BILATERAL SCIATICA, UNSPECIFIED BACK PAIN LATERALITY: ICD-10-CM

## 2021-10-27 DIAGNOSIS — M54.42 CHRONIC LOW BACK PAIN WITH BILATERAL SCIATICA, UNSPECIFIED BACK PAIN LATERALITY: ICD-10-CM

## 2021-10-27 PROCEDURE — 99213 OFFICE O/P EST LOW 20 MIN: CPT | Performed by: NURSE PRACTITIONER

## 2021-10-27 PROCEDURE — 99213 OFFICE O/P EST LOW 20 MIN: CPT

## 2021-10-27 RX ORDER — HYDROCODONE BITARTRATE AND ACETAMINOPHEN 7.5; 325 MG/1; MG/1
1 TABLET ORAL EVERY 8 HOURS PRN
Qty: 90 TABLET | Refills: 0 | Status: SHIPPED | OUTPATIENT
Start: 2021-10-27 | End: 2021-11-30 | Stop reason: SDUPTHER

## 2021-10-27 ASSESSMENT — PAIN DESCRIPTION - LOCATION: LOCATION: BACK

## 2021-10-27 ASSESSMENT — PAIN SCALES - GENERAL: PAINLEVEL_OUTOF10: 8

## 2021-10-27 ASSESSMENT — PAIN DESCRIPTION - PAIN TYPE: TYPE: CHRONIC PAIN

## 2021-10-27 NOTE — PROGRESS NOTES
[facility] Physical and Pain Medicine    Office Progress Note    Patient Name: Lotus Rhodes    MR #: [id number]    Account #: [de-identified]    : 1970    Age: 54 y.o. Sex: male    Date: 2021    PCP: RENA Waldrop CNP         Referring Provider:    Chief Complaint:   Chief Complaint   Patient presents with    Lower Back Pain       History of Present Illness:    Lotus Rhodes is a 54 y.o. male who presents to the office for follow-up of bilateral knee injections with Clemente Ramus and left shoulder injection. He says that he obtained 80% relief for 2 months. He says that he cannot afford them at this time due to the cost.     Last pain management HPI note read    Employment: Retired []   Disabled  []   Works [x]    Does Not Work []     Previous Injury:  Yes  []   No [x]     Previous Surgery: Yes [x]   No []     Previous Physical Therapy In the last 6 months? Yes  []     No [x]   Did Physical Therapy make thepain better or worse? Better []   Worse []  Unchanged []    MRI in the last two years? Yes []  No [x]   Results reviewed with patient? Yes []   No []    CT Scan in the last two years? Yes  []   No [x]   Results reviewed with patient? Yes []   No []    X-ray in the last two years? Yes [x]   No  []  Results reviewed with patient? Yes  [x]  No []    Injections in the past?  Yes [x]   No []   Did the injections help relieve the pain? Yes [x]   No []     Do you have Depression? Yes  []    No [x]   Thinking of harming yourself or others? Yes  []   No [x]     Past Medical Histoy  Past Medical History:   Diagnosis Date    Hypertension     Primary osteoarthritis of both knees        Surgery History  No past surgical history on file. Allergies  Patient has no known allergies.      Current Medications  Current Outpatient Medications   Medication Sig Dispense Refill    HYDROcodone-acetaminophen (NORCO) 7.5-325 MG per tablet Take 1 tablet by mouth every 8 hours as needed for Pain for up to 30 days. May fill 10- 90 tablet 0    amLODIPine (NORVASC) 10 MG tablet amlodipine 10 mg tablet   TAKE 1 TABLET BY MOUTH EVERY DAY  DOSES DO NOT START BEFORE 0928      sertraline (ZOLOFT) 25 MG tablet TAKE 1 TABLET BY MOUTH EVERY DAY      meloxicam (MOBIC) 15 MG tablet meloxicam 15 mg tablet   TAKE 1 TABLET BY MOUTH EVERY DAY      lisinopril (PRINIVIL;ZESTRIL) 10 MG tablet TAKE 1 TABLET BY MOUTH TWICE A DAY      omeprazole (PRILOSEC) 40 MG delayed release capsule Take 40 mg by mouth daily      hydroCHLOROthiazide (HYDRODIURIL) 25 MG tablet Take 1 tablet by mouth daily      omeprazole (PRILOSEC) 40 MG delayed release capsule Take 1 capsule by mouth daily       No current facility-administered medications for this encounter. Social History    Social History     Tobacco Use    Smoking status: Current Every Day Smoker    Smokeless tobacco: Never Used   Substance Use Topics    Alcohol use: Not Currently         Family History  family history is not on file. Review of Systems:  Constitutional: denies fever, chills, fatigue, change in appetite, weight gain or weight loss  Head: Normocephalic  Skin: denies easy bruising, skin redness, skin rash, hives, sensitivity to sun exposure, tightness, nodules or bumps, hair loss, color changes in the hands or feet with cold. Eyes: denies pain, redness, loss of vision, double or blurred vision, eye drainage, or dryness. ENT and Mouth: denies ringing in the ears, loss of hearing, nasal congestion, nasal discharge, no hoarseness, sore throat, or difficulty swallowing   Respiratory: denies chronic dry cough, coughing up blood, coughing up mucus, waking at night coughing or choking, or wheezing.    Cardiovascular: denies chest pain, irregular heartbeats, palpitations, shortness of breath, or edema in legs  Gastrointestinal: denies, nausea, vomiting, heartburn, diarrhea, or constipation  Genitourinary: denies difficult urination, pain or burning with urination, blood in the urine, or cloudy urine  Musculoskeletal: denies arm, buttock, thigh or calf cramps. Has pain in bilateral knees and left shoulder, left shoulder and bilateral knee tenderness. No muscle weakness. No joint swelling. Neurologic: headache, dizziness, fainting, loss of consciousness, no memory loss. no sensitivity. Endocrine: denies intolerance to hot or cold temperature, night sweats, flushing, fingernail changes, increased thirst, or hairloss   Hematologic/ Lymphatic: denies anemia, bleeding tendency or clotting tendency, bruising easily. Allergic/ Immunologic: denies rhinitis, asthma, skin sensitivity, or allergy to Latex   Psychiatric: denies depression or thoughts of suicide, or voices in head. Current Pain Assessment:   Pain Assessment  Pain Assessment: 0-10  Pain Level: 8  Patient's Stated Pain Goal: 3  Pain Type: Chronic pain  Pain Location: Back    Clinical Progression: gradually improving  Effect of Pain on Daily Activities: limits activity  Patient's Stated Pain Goal: No pain  Pain Intervention(s): Medication (see eMar), Repositioning, Rest, Ice    Current PE    ORT Score: 1    PHQ-9 Score: 0    Physical Exam:    Vitals:    10/27/21 1439   BP: (!) 160/86   Pulse: 90   Temp: 97 °F (36.1 °C)   TempSrc: Temporal   SpO2: 98%   Weight: 214 lb (97.1 kg)   Height: 6' 2\" (1.88 m)       Body mass index is 27.48 kg/m². General Appearance: no acute distress. Appears to be well dressed  Skin Exam: Warm and dry, no jaundice, rashes or lesions  Head Exam: NCAT, PERRLA, EOMI, moist mucus membranes, scalp normal  Neck Exam: Supple, no masses palpated, trachea midline. Lung: Clear to ausculation in all lobes anterior and posterior. Heart: Regular rate and rhythm, no gallops, rubs or murmurs, no edema  Abdomen:  Bowel sounds in all quadrants, soft, non-distended, non-tender with palpation, no guarding  Extremities: No rash, cyanosis or bruising  Musculoskeletal: No joint swelling or deformity   Back Exam: full ROM  Hip Exam: Full rotation bilateral   Knee Exam: crepitus in bilateral knees with flexion and extension  Shoulder Exam: pain with rotation of left shoulder  Neurologic Exam: Gait and coordination normal, speech normal  Reflexes: Normal brachialis, Negative Redd's bilateral. Normal Patellar bilateral,   CN EXAM: II-XII intact, face symmetrical, tongue symmetrical, the trapezius and sternocleidomastoid muscle appearance and strength symmetrical, normal achilles bilateral, ankle clonus negative bilateral  Strength: 5/5 RUE Bi's/Tri's, 5/5 LUE Bi's/Tri's, 5/5 RLE knee flex/ext, 5/5 RLE DF/PF, 5/5 LLE knee flex/ext, 5/5 LLE DF/PF  Sensation: Equal and intact to fine touch in all extremities  Mood and affect: Normal   Nurses note reviewed along with current vital signs    Active Problem(s)  Active Problems:    Primary osteoarthritis of both knees    Chronic left shoulder pain  Resolved Problems:    * No resolved hospital problems. *                                                                                                                            PLAN:  1. Patient is to call the office with any questions or concerns that may arise prior to next appointment. 2. Continue Norco  3. F/U in 3 months    Patient can call and schedule left shoulder injection and/or bilateral knee injections with Zilretta    Urine Drug Screen Current/Today:  Yes  []  No [x]     Discussion:  Discussed exam findings and plan of care with patient. Patient agreed with the current plan of care at this time. All questions from the patient were answered by the provider. Activity:   Discussed exercise as beneficial to pain reduction, encouraged stretching exercise with a focus on torso strengthening.     Education Provided:  Review of Mariposa Sommers [x] Agreement Review [x]  Reviewed PHQ-9  [x]    Review of Test [] Compliance Issues Discussed []   Cognitive Behavior Needs [] Exercise [x]  Financial Issues []   Tobacco/Alcohol Use [] Teaching  [x]     Goal:  Pain Management Goals of Therapy:   []        Resolution in pain  [x]        Decrease in pain level  [x]        Improvement in ADL's  [x]        Increase in activities with less pain  [x]        Decrease in medication      [] Benzodiazapine's and Narcotics:  Patient educated on the possible effects of combining Benzodiazapine's and Opioids. Explained \"Black Box Warnings\" such as; possible suppressed breathing, hypoxia, anoxia, depressed cognition, heart arrhythmia, coma and possible death. Patient verbalized understanding concerning possible effects. Controlled Substance Monitoring:  Attestation: The ALETHEA report for this patient was reviewed today. Discussed with patient possible medication side effects, risk of tolerance, dependence and alternative treatments. Discussed the growing epidemic in the [place] with the overprescribing and at times the abuse of narcotics. Discussed the detrimental effects of long term narcotic use. Patient encouraged to set daily goals of exercising and decreasing daily narcotic intake. Discussed with the patient about the development of hyperalgesia with long term narcotic intake. EMR dragon/transcription disclaimer: Much of this encounter note is electronic transcription/translation of spoken language to printed tach. Electronic translation of spoken language may be erroneous, or at times, nonsensical words or phrases may be inadvertently transcribed.  Although, I have reviewed the note for such errors, some may still exist.     CC:  Augustus Nissen, [address] [facility], APRN, 10/27/2021 at 3:07 PM

## 2021-10-27 NOTE — PROGRESS NOTES
Clinic Documentation      Education Provided:  [x] Review of [place]  [] Agreement Review  [x] PEG Score Calculated [] PHQ Score Calculated [] ORT Score Calculated    [] Compliance Issues Discussed [] Cognitive Behavior Needs [x] Exercise [] Review of Test [] Financial Issues  [x] Tobacco/Alcohol Use Reviewed [x] Teaching [] New Patient [] Picture Obtained    Physician Plan:  [] Outgoing Referral  [] Pharmacy Consult  [] Test Ordered [x] Prescription Ordered/Changed   [] Obtained Test Results / Consult Notes        Complete if patient is withholding blood thinner for procedure     Blood Thinner Patient is currently taking:      [] Plavix (Hold for 7 days)  [] Aspirin (Hold for 5 days)     [] Pletal (Hold for 2 days)  [] Pradaxa (Hold for 3 days)    [] Effient (Hold for 7 days)  [] Xarelto (Hold for 2 days)    [] Eliquis (Hold for 2 days)  [] Brilinta (Hold for 7 days)    [] Coumadin (Hold for 5 days) - (INR needs to be drawn the day prior to procedure- INR < 2.0)    [] Aggrenox (Hold for 7 days)        [] Patient will stop medication on their own.    [] Blood Thinner Form Faxed for approval to hold.    Provider form faxed to:    Assessment Completed by:  Electronically signed by Ferny Helms on 10/27/2021 at 2:59 PM

## 2021-11-30 DIAGNOSIS — G89.29 CHRONIC LOW BACK PAIN WITH BILATERAL SCIATICA, UNSPECIFIED BACK PAIN LATERALITY: ICD-10-CM

## 2021-11-30 DIAGNOSIS — M54.42 CHRONIC LOW BACK PAIN WITH BILATERAL SCIATICA, UNSPECIFIED BACK PAIN LATERALITY: ICD-10-CM

## 2021-11-30 DIAGNOSIS — M54.41 CHRONIC LOW BACK PAIN WITH BILATERAL SCIATICA, UNSPECIFIED BACK PAIN LATERALITY: ICD-10-CM

## 2021-11-30 RX ORDER — HYDROCODONE BITARTRATE AND ACETAMINOPHEN 7.5; 325 MG/1; MG/1
1 TABLET ORAL EVERY 8 HOURS PRN
Qty: 90 TABLET | Refills: 0 | Status: SHIPPED | OUTPATIENT
Start: 2021-12-30 | End: 2022-02-28

## 2021-11-30 RX ORDER — HYDROCODONE BITARTRATE AND ACETAMINOPHEN 7.5; 325 MG/1; MG/1
1 TABLET ORAL EVERY 8 HOURS PRN
Qty: 90 TABLET | Refills: 0 | Status: SHIPPED | OUTPATIENT
Start: 2021-11-30 | End: 2022-01-31 | Stop reason: SDUPTHER

## 2022-01-31 DIAGNOSIS — G89.29 CHRONIC LOW BACK PAIN WITH BILATERAL SCIATICA, UNSPECIFIED BACK PAIN LATERALITY: ICD-10-CM

## 2022-01-31 DIAGNOSIS — M54.41 CHRONIC LOW BACK PAIN WITH BILATERAL SCIATICA, UNSPECIFIED BACK PAIN LATERALITY: ICD-10-CM

## 2022-01-31 DIAGNOSIS — M54.42 CHRONIC LOW BACK PAIN WITH BILATERAL SCIATICA, UNSPECIFIED BACK PAIN LATERALITY: ICD-10-CM

## 2022-01-31 RX ORDER — HYDROCODONE BITARTRATE AND ACETAMINOPHEN 7.5; 325 MG/1; MG/1
1 TABLET ORAL EVERY 8 HOURS PRN
Qty: 90 TABLET | Refills: 0 | Status: SHIPPED | OUTPATIENT
Start: 2022-01-31 | End: 2022-02-28 | Stop reason: SDUPTHER

## 2022-02-28 DIAGNOSIS — M54.41 CHRONIC LOW BACK PAIN WITH BILATERAL SCIATICA, UNSPECIFIED BACK PAIN LATERALITY: ICD-10-CM

## 2022-02-28 DIAGNOSIS — G89.29 CHRONIC LOW BACK PAIN WITH BILATERAL SCIATICA, UNSPECIFIED BACK PAIN LATERALITY: ICD-10-CM

## 2022-02-28 DIAGNOSIS — M54.42 CHRONIC LOW BACK PAIN WITH BILATERAL SCIATICA, UNSPECIFIED BACK PAIN LATERALITY: ICD-10-CM

## 2022-02-28 RX ORDER — HYDROCODONE BITARTRATE AND ACETAMINOPHEN 7.5; 325 MG/1; MG/1
1 TABLET ORAL EVERY 8 HOURS PRN
Qty: 90 TABLET | Refills: 0 | Status: SHIPPED | OUTPATIENT
Start: 2022-03-02 | End: 2022-03-16 | Stop reason: SDUPTHER

## 2022-03-16 ENCOUNTER — HOSPITAL ENCOUNTER (OUTPATIENT)
Dept: PAIN MANAGEMENT | Age: 52
Discharge: HOME | End: 2022-03-16
Payer: COMMERCIAL

## 2022-03-16 VITALS
BODY MASS INDEX: 27.21 KG/M2 | DIASTOLIC BLOOD PRESSURE: 88 MMHG | WEIGHT: 212 LBS | SYSTOLIC BLOOD PRESSURE: 151 MMHG | TEMPERATURE: 97 F | HEIGHT: 74 IN | OXYGEN SATURATION: 97 % | HEART RATE: 86 BPM

## 2022-03-16 DIAGNOSIS — G89.29 CHRONIC LOW BACK PAIN WITH BILATERAL SCIATICA, UNSPECIFIED BACK PAIN LATERALITY: ICD-10-CM

## 2022-03-16 DIAGNOSIS — M54.42 CHRONIC LOW BACK PAIN WITH BILATERAL SCIATICA, UNSPECIFIED BACK PAIN LATERALITY: ICD-10-CM

## 2022-03-16 DIAGNOSIS — M54.41 CHRONIC LOW BACK PAIN WITH BILATERAL SCIATICA, UNSPECIFIED BACK PAIN LATERALITY: ICD-10-CM

## 2022-03-16 PROCEDURE — 99215 OFFICE O/P EST HI 40 MIN: CPT

## 2022-03-16 PROCEDURE — 99213 OFFICE O/P EST LOW 20 MIN: CPT | Performed by: NURSE PRACTITIONER

## 2022-03-16 RX ORDER — HYDROCODONE BITARTRATE AND ACETAMINOPHEN 7.5; 325 MG/1; MG/1
1 TABLET ORAL EVERY 8 HOURS PRN
Qty: 90 TABLET | Refills: 0 | Status: SHIPPED | OUTPATIENT
Start: 2022-04-01 | End: 2022-05-01

## 2022-03-16 ASSESSMENT — PAIN SCALES - GENERAL: PAINLEVEL_OUTOF10: 7

## 2022-03-16 ASSESSMENT — PAIN DESCRIPTION - LOCATION: LOCATION: BACK

## 2022-03-16 ASSESSMENT — PAIN DESCRIPTION - PAIN TYPE: TYPE: CHRONIC PAIN

## 2022-03-16 NOTE — PROGRESS NOTES
Clinic Documentation      Education Provided:  [x] Review of Fernanda Nobles  [x] Agreement Review  [x] PEG Score Calculated [x] PHQ Score Calculated [x] ORT Score Calculated    [] Compliance Issues Discussed [] Cognitive Behavior Needs [x] Exercise [] Review of Test [] Financial Issues  [x] Tobacco/Alcohol Use Reviewed [x] Teaching [] New Patient [] Picture Obtained    Physician Plan:  [] Outgoing Referral  [] Pharmacy Consult  [] Test Ordered [x] Prescription Ordered/Changed   [] Obtained Test Results / Consult Notes        Complete if patient is withholding blood thinner for procedure     Blood Thinner Patient is currently taking:      [] Plavix (Hold for 7 days)  [] Aspirin (Hold for 5 days)     [] Pletal (Hold for 2 days)  [] Pradaxa (Hold for 3 days)    [] Effient (Hold for 7 days)  [] Xarelto (Hold for 2 days)    [] Eliquis (Hold for 2 days)  [] Brilinta (Hold for 7 days)    [] Coumadin (Hold for 5 days) - (INR needs to be drawn the day prior to procedure- INR < 2.0)    [] Aggrenox (Hold for 7 days)        [] Patient will stop medication on their own.    [] Blood Thinner Form Faxed for approval to hold.    Provider form faxed to:     Assessment Completed by:  Electronically signed by Gerri Lowery MA on 3/16/2022 at 1:07 PM

## 2022-03-16 NOTE — PROGRESS NOTES
[facility]    Office Progress Note    Patient Name: Estefania Hutchinson    MR #: [id number]    Account #: [de-identified]    : 1970    Age: 54 y.o. Sex: male    Date: 2022    PCP: RENA Middleton CNP         Referring Provider:    Chief Complaint:   Chief Complaint   Patient presents with    Lower Back Pain       History of Present Illness:    Estefania Hutchinson is a 54 y.o. male who presents to the office for follow-up of low back and bilateral knee pain. He says that he can tolerate his back pain, but he is having a lot of pain in his knees. He has had bilateral knee injections with [facility] in the past and had 80% relief for 6 weeks. He has now changed insurance companies and feels as though it will pay better than his previous insurance. He is wanting to be scheduled for more injections. He continues to take his Norco with some relief in his pain that allows him to continue working. Last pain management HPI note read    Employment: Retired []   Disabled  []   Works [x]    Does Not Work []     Previous Injury:  Yes  []   No [x]     Previous Surgery: Yes [x]   No []     Previous Physical Therapy In the last 6 months? Yes  []     No [x]   Did Physical Therapy make thepain better or worse? Better []   Worse []  Unchanged []    MRI in the last two years? Yes []  No [x]   Results reviewed with patient? Yes []   No []    CT Scan in the last two years? Yes  []   No [x]   Results reviewed with patient? Yes []   No []    X-ray in the last two years? Yes [x]   No  []  Results reviewed with patient? Yes  [x]  No []    Injections in the past?  Yes [x]   No []   Did the injections help relieve the pain? Yes [x]   No []     Do you have Depression? Yes  []    No [x]   Thinking of harming yourself or others?   Yes  []   No [x]     Past Medical Histoy  Past Medical History:   Diagnosis Date    Hypertension     Primary osteoarthritis of both knees Surgery History  History reviewed. No pertinent surgical history. Allergies  Gabapentin     Current Medications  Current Outpatient Medications   Medication Sig Dispense Refill    [START ON 4/1/2022] HYDROcodone-acetaminophen (NORCO) 7.5-325 MG per tablet Take 1 tablet by mouth every 8 hours as needed for Pain for up to 30 days. May fill 4-1-2022 90 tablet 0    amLODIPine (NORVASC) 10 MG tablet amlodipine 10 mg tablet   TAKE 1 TABLET BY MOUTH EVERY DAY  DOSES DO NOT START BEFORE 0928      sertraline (ZOLOFT) 25 MG tablet TAKE 1 TABLET BY MOUTH EVERY DAY      meloxicam (MOBIC) 15 MG tablet meloxicam 15 mg tablet   TAKE 1 TABLET BY MOUTH EVERY DAY      lisinopril (PRINIVIL;ZESTRIL) 10 MG tablet TAKE 1 TABLET BY MOUTH TWICE A DAY      omeprazole (PRILOSEC) 40 MG delayed release capsule Take 40 mg by mouth daily      hydroCHLOROthiazide (HYDRODIURIL) 25 MG tablet Take 1 tablet by mouth daily      omeprazole (PRILOSEC) 40 MG delayed release capsule Take 1 capsule by mouth daily       No current facility-administered medications for this encounter. Social History    Social History     Tobacco Use    Smoking status: Current Every Day Smoker    Smokeless tobacco: Never Used   Substance Use Topics    Alcohol use: Not Currently         Family History  family history is not on file. Review of Systems:  Constitutional: denies fever, chills, fatigue, change in appetite, weight gain or weight loss  Head: Normocephalic  Skin: denies easy bruising, skin redness, skin rash, hives, sensitivity to sun exposure, tightness, nodules or bumps, hair loss, color changes in the hands or feet with cold. Eyes: denies pain, redness, loss of vision, double or blurred vision, eye drainage, or dryness.    ENT and Mouth: denies ringing in the ears, loss of hearing, nasal congestion, nasal discharge, no hoarseness, sore throat, or difficulty swallowing   Respiratory: denies chronic dry cough, coughing up blood, coughing up mucus, waking at night coughing or choking, or wheezing. Cardiovascular: denies chest pain, irregular heartbeats, palpitations, shortness of breath, or edema in legs  Gastrointestinal: denies, nausea, vomiting, heartburn, diarrhea, or constipation  Genitourinary: denies difficult urination, pain or burning with urination, blood in the urine, or cloudy urine  Musculoskeletal: denies arm, buttock, thigh or calf cramps. Has pain in bilateral knees and bilateral knee tenderness. Tenderness in low back. No muscle weakness. No joint swelling. Neurologic: headache, dizziness, fainting, loss of consciousness, no memory loss. no sensitivity. Endocrine: denies intolerance to hot or cold temperature, night sweats, flushing, fingernail changes, increased thirst, or hairloss   Hematologic/ Lymphatic: denies anemia, bleeding tendency or clotting tendency, bruising easily. Allergic/ Immunologic: denies rhinitis, asthma, skin sensitivity, or allergy to Latex   Psychiatric: denies depression or thoughts of suicide, or voices in head. Current Pain Assessment:   Pain Assessment  Pain Assessment: 0-10  Pain Level: 7  Patient's Stated Pain Goal: 2  Pain Type: Chronic pain  Pain Location: Back    Clinical Progression: gradually improving  Effect of Pain on Daily Activities: limits activity  Patient's Stated Pain Goal: No pain  Pain Intervention(s): Medication (see eMar), Repositioning, Rest, Ice    Current PE    ORT Score: 4    PHQ-9 Score: 7    Physical Exam:    Vitals:    22 1314   BP: (!) 151/88   Pulse: 86   Temp: 97 °F (36.1 °C)   TempSrc: Temporal   SpO2: 97%   Weight: 212 lb (96.2 kg)   Height: 6' 2\" (1.88 m)       Body mass index is 27.22 kg/m². General Appearance: no acute distress. Appears to be well dressed  Skin Exam: Warm and dry, no jaundice, rashes or lesions  Head Exam: NCAT, PERRLA, EOMI, moist mucus membranes, scalp normal  Neck Exam: Supple, no masses palpated, trachea midline. Lung: Clear to ausculation in all lobes anterior and posterior. Heart: Regular rate and rhythm, no gallops, rubs or murmurs, no edema  Abdomen: Bowel sounds in all quadrants, soft, non-distended, non-tender with palpation, no guarding  Extremities: No rash, cyanosis or bruising  Musculoskeletal: No joint swelling or deformity   Back Exam: Tender ness with palpation of low back  Hip Exam: Full rotation bilateral   Knee Exam: severe crepitus in bilateral knees with flexion and extension  Shoulder Exam: slight pain with rotation of left shoulder  Neurologic Exam: Gait and coordination normal, speech normal  Reflexes: Normal brachialis, Negative Redd's bilateral. Normal Patellar bilateral,   CN EXAM: II-XII intact, face symmetrical, tongue symmetrical, the trapezius and sternocleidomastoid muscle appearance and strength symmetrical, normal achilles bilateral, ankle clonus negative bilateral  Strength: 5/5 RUE Bi's/Tri's, 5/5 LUE Bi's/Tri's, 5/5 RLE knee flex/ext, 5/5 RLE DF/PF, 5/5 LLE knee flex/ext, 5/5 LLE DF/PF  Sensation: Equal and intact to fine touch in all extremities  Mood and affect: Normal   Nurses note reviewed along with current vital signs    Active Problem(s)  Active Problems:    Primary osteoarthritis of both knees    Myofascial muscle pain    Muscle spasm of back    Chronic bilateral low back pain without sciatica  Resolved Problems:    * No resolved hospital problems. *                                                                                                                            PLAN:  1. Patient is to call the office with any questions or concerns that may arise prior to next appointment. 2. Schedule patient for bilateral knee injections with [facility]  3. Continue Norco    Urine Drug Screen Current/Today:  Yes  [x]  No []     Discussion:  Discussed exam findings and plan of care with patient. Patient agreed with the current plan of care at this time.  All questions from the patient were answered by the provider. Activity:   Discussed exercise as beneficial to pain reduction, encouraged stretching exercise with a focus on torso strengthening. Education Provided:  Review of [place]autumn Pedersen [x] Agreement Review [x]  Reviewed PHQ-9  [x]    Review of Test [] Compliance Issues Discussed []   Cognitive Behavior Needs [] Exercise [x]  Financial Issues []   Tobacco/Alcohol Use [] Teaching  [x]     Goal:  Pain Management Goals of Therapy:   []        Resolution in pain  [x]        Decrease in pain level  [x]        Improvement in ADL's  [x]        Increase in activities with less pain  [x]        Decrease in medication      [] Benzodiazapine's and Narcotics:  Patient educated on the possible effects of combining Benzodiazapine's and Opioids. Explained \"Black Box Warnings\" such as; possible suppressed breathing, hypoxia, anoxia, depressed cognition, heart arrhythmia, coma and possible death. Patient verbalized understanding concerning possible effects. Controlled Substance Monitoring:  Attestation: The [facility] report for this patient was reviewed today. Discussed with patient possible medication side effects, risk of tolerance, dependence and alternative treatments. Discussed the growing epidemic in the [place] with the overprescribing and at times the abuse of narcotics. Discussed the detrimental effects of long term narcotic use. Patient encouraged to set daily goals of exercising and decreasing daily narcotic intake. Discussed with the patient about the development of hyperalgesia with long term narcotic intake. EMR dragon/transcription disclaimer: Much of this encounter note is electronic transcription/translation of spoken language to printed tach. Electronic translation of spoken language may be erroneous, or at times, nonsensical words or phrases may be inadvertently transcribed.  Although, I have reviewed the note for such errors, some may still exist.     CC:  RENA Sparks - JESSICA Tafoya, APRN, 3/16/2022 at 1:23 PM

## 2022-03-23 ENCOUNTER — TELEPHONE (OUTPATIENT)
Dept: PAIN MANAGEMENT | Age: 52
End: 2022-03-23